# Patient Record
Sex: FEMALE | Race: WHITE | Employment: PART TIME | URBAN - METROPOLITAN AREA
[De-identification: names, ages, dates, MRNs, and addresses within clinical notes are randomized per-mention and may not be internally consistent; named-entity substitution may affect disease eponyms.]

---

## 2023-04-14 ENCOUNTER — APPOINTMENT (OUTPATIENT)
Dept: GENERAL RADIOLOGY | Age: 38
End: 2023-04-14
Payer: COMMERCIAL

## 2023-04-14 ENCOUNTER — HOSPITAL ENCOUNTER (EMERGENCY)
Age: 38
Discharge: HOME | End: 2023-04-14
Attending: EMERGENCY MEDICINE
Payer: COMMERCIAL

## 2023-04-14 VITALS
WEIGHT: 117 LBS | SYSTOLIC BLOOD PRESSURE: 131 MMHG | RESPIRATION RATE: 16 BRPM | HEART RATE: 92 BPM | DIASTOLIC BLOOD PRESSURE: 79 MMHG | BODY MASS INDEX: 18.32 KG/M2 | OXYGEN SATURATION: 100 % | TEMPERATURE: 99.1 F

## 2023-04-14 DIAGNOSIS — B35.1 ONYCHOMYCOSIS: Primary | ICD-10-CM

## 2023-04-14 PROCEDURE — 73630 X-RAY EXAM OF FOOT: CPT

## 2023-04-14 PROCEDURE — 99283 EMERGENCY DEPT VISIT LOW MDM: CPT

## 2023-04-14 ASSESSMENT — PAIN DESCRIPTION - ORIENTATION: ORIENTATION: LEFT;RIGHT

## 2023-04-14 ASSESSMENT — ENCOUNTER SYMPTOMS
COLOR CHANGE: 0
NAUSEA: 0
ABDOMINAL PAIN: 0
COUGH: 0
WHEEZING: 0
VOMITING: 0

## 2023-04-14 ASSESSMENT — PAIN - FUNCTIONAL ASSESSMENT: PAIN_FUNCTIONAL_ASSESSMENT: 0-10

## 2023-04-14 ASSESSMENT — PAIN DESCRIPTION - FREQUENCY: FREQUENCY: INTERMITTENT

## 2023-04-14 ASSESSMENT — PAIN SCALES - GENERAL: PAINLEVEL_OUTOF10: 7

## 2023-04-14 ASSESSMENT — PAIN DESCRIPTION - LOCATION: LOCATION: TOE (COMMENT WHICH ONE)

## 2023-04-14 ASSESSMENT — PAIN DESCRIPTION - DESCRIPTORS: DESCRIPTORS: SHOOTING

## 2023-04-15 NOTE — DISCHARGE INSTRUCTIONS
Are the names of 3 podiatrist listed above that you can call to schedule follow-up depending on who accepts your insurance    Please remember to put some padding in between the great toe and second toe to help where it is rubbing    Return to the emergency room for any emergent concerns

## 2023-04-15 NOTE — ED PROVIDER NOTES
eMERGENCY dEPARTMENT eNCOUnter   Independent Attestation     Pt Name: Sonu Raman  MRN: [id number]  Germangfcassidy 1985  Date of evaluation: 4/14/23     Sonu Raman is a 40 y.o. female with CC: Nail Problem (Nail pain  right and left great toe (fungus)) and Toe Pain (Jamed left great toe yesterday)      Based on the medical record the care appears appropriate. I was personally available for consultation in the Emergency Department.     Bradley Booker DO  Attending Emergency Physician                 Bradley Booker DO  04/14/23 [id number]
Temp 99.1 °F (37.3 °C) (Oral)   Resp 16   Wt 117 lb (53.1 kg)   LMP 03/31/2023   SpO2 100%   BMI 18.32 kg/m²    Physical Exam  Constitutional:       Appearance: She is well-developed. She is not diaphoretic. HENT:      Head: Normocephalic and atraumatic. Right Ear: External ear normal.      Left Ear: External ear normal.   Eyes:      General: No scleral icterus. Right eye: No discharge. Left eye: No discharge. Neck:      Trachea: No tracheal deviation. Cardiovascular:      Rate and Rhythm: Normal rate and regular rhythm. Heart sounds: Normal heart sounds. No murmur heard. No gallop. Pulmonary:      Effort: Pulmonary effort is normal. No respiratory distress. Breath sounds: Normal breath sounds. No stridor. Abdominal:      Tenderness: There is no abdominal tenderness. There is no guarding or rebound. Musculoskeletal:         General: Normal range of motion. Cervical back: Normal range of motion. Comments: The great toenail and second toenail bilaterally are very thick, curved towards the second toe and grayish-black in color. Distal cap refill is less than 2 seconds. On the medial aspect of the bilateral second toes there is abrasions   Skin:     General: Skin is warm and dry. Coloration: Skin is not pale. Findings: No rash (on exposed surfaces). Neurological:      Mental Status: She is alert and oriented to person, place, and time. Coordination: Coordination normal.   Psychiatric:         Behavior: Behavior normal.       MEDICAL DECISION MAKING / ED COURSE:   Summary of Patient Presentation:    Patient is a 59-year-old female who presents with bilateral toenail fungus. She did have an injury to the toe yesterday. X-ray does not show any acute abnormality. I did visualize the 3 view of the left foot which shows no acute fracture or injury. Differential diagnosis would include toenail fungus versus fracture.   There is no fracture seen